# Patient Record
Sex: FEMALE
[De-identification: names, ages, dates, MRNs, and addresses within clinical notes are randomized per-mention and may not be internally consistent; named-entity substitution may affect disease eponyms.]

---

## 2020-07-20 PROBLEM — Z00.00 ENCOUNTER FOR PREVENTIVE HEALTH EXAMINATION: Status: ACTIVE | Noted: 2020-07-20

## 2020-07-21 ENCOUNTER — TRANSCRIPTION ENCOUNTER (OUTPATIENT)
Age: 65
End: 2020-07-21

## 2020-07-21 ENCOUNTER — APPOINTMENT (OUTPATIENT)
Dept: VASCULAR SURGERY | Facility: CLINIC | Age: 65
End: 2020-07-21
Payer: MEDICARE

## 2020-07-21 VITALS
SYSTOLIC BLOOD PRESSURE: 129 MMHG | HEART RATE: 95 BPM | BODY MASS INDEX: 30.56 KG/M2 | DIASTOLIC BLOOD PRESSURE: 81 MMHG | TEMPERATURE: 97.6 F | WEIGHT: 197 LBS | OXYGEN SATURATION: 96 % | HEIGHT: 67.5 IN

## 2020-07-21 DIAGNOSIS — S83.206A UNSPECIFIED TEAR OF UNSPECIFIED MENISCUS, CURRENT INJURY, RIGHT KNEE, INITIAL ENCOUNTER: ICD-10-CM

## 2020-07-21 DIAGNOSIS — Z82.49 FAMILY HISTORY OF ISCHEMIC HEART DISEASE AND OTHER DISEASES OF THE CIRCULATORY SYSTEM: ICD-10-CM

## 2020-07-21 DIAGNOSIS — Z72.89 OTHER PROBLEMS RELATED TO LIFESTYLE: ICD-10-CM

## 2020-07-21 DIAGNOSIS — I83.93 ASYMPTOMATIC VARICOSE VEINS OF BILATERAL LOWER EXTREMITIES: ICD-10-CM

## 2020-07-21 DIAGNOSIS — Z81.8 FAMILY HISTORY OF OTHER MENTAL AND BEHAVIORAL DISORDERS: ICD-10-CM

## 2020-07-21 DIAGNOSIS — Z87.891 PERSONAL HISTORY OF NICOTINE DEPENDENCE: ICD-10-CM

## 2020-07-21 DIAGNOSIS — M71.20 SYNOVIAL CYST OF POPLITEAL SPACE [BAKER], UNSPECIFIED KNEE: ICD-10-CM

## 2020-07-21 DIAGNOSIS — I83.10 VARICOSE VEINS OF UNSPECIFIED LOWER EXTREMITY WITH INFLAMMATION: ICD-10-CM

## 2020-07-21 PROCEDURE — 99204 OFFICE O/P NEW MOD 45 MIN: CPT

## 2020-07-21 PROCEDURE — 93970 EXTREMITY STUDY: CPT

## 2020-07-21 NOTE — ASSESSMENT
[Other: _____] : [unfilled] [Arterial/Venous Disease] : arterial/venous disease [FreeTextEntry1] : pt will need rt aasv evlt, rt stab phlebectomy and b/l sclerotherapy; Rx given for thigh high 20-30 mmHg support hose;

## 2020-07-21 NOTE — PHYSICAL EXAM
[JVD] : no jugular venous distention  [2+] : left 2+ [Ankle Swelling (On Exam)] : present [Varicose Veins Of Lower Extremities] : present [Varicose Veins Of The Right Leg] : of the right leg [Ankle Swelling Bilaterally] : severe [] : of the right leg [Ankle Swelling On The Left] : moderate [No Rash or Lesion] : No rash or lesion [Purpura] : no purpura  [Petechiae] : no petechiae [Skin Ulcer] : no ulcer [Alert] : alert [Skin Induration] : no induration [Oriented to Person] : oriented to person [Oriented to Place] : oriented to place [Oriented to Time] : oriented to time [Calm] : calm [de-identified] : wnl [de-identified] : wdwn nad [de-identified] : josel [de-identified] : wnl [FreeTextEntry1] : very large branch varicose veins rt anterolateral thigh, rt knee swelling, left medial ankle surgical incision \par b/l scattered spider veins ( see photos); no venous ulcers; normal arterial pulses b/l legs [de-identified] : as above

## 2020-07-21 NOTE — DATA REVIEWED
[FreeTextEntry1] : \par RVT Penalo performed b/l VLE:\par \par Today’s bilateral leg ultrasound results confirm no DVT and no SVT bilateral leg.\par \par + superficial reflux  severe rt aasv and left mid gsv\par + bilateral  Bakers cyst  rt> left\par \par

## 2020-07-21 NOTE — REASON FOR VISIT
[Initial Evaluation] : an initial evaluation [FreeTextEntry1] : Sent by her friend WARREN Kirby - pt with severe rt aasv reflux with very large anterolateral thigh branch varicose veins, swelling, pain, edmea and rt medial ankle venous stasis. Pt also has severe rt knee swelling with meniscus injury receiving physical therapy and steroid injections. Left mid gsv reflux but no severe left ;eg cvi sx at this time. Pt has intermittently worn support hose with minimal relief. She needs rt AASV evlt, stab phlebectomy and b/l sclerotherapy. Rx given for thigh high support hose 20-30 mmHg.

## 2020-07-21 NOTE — PROCEDURE
[FreeTextEntry1] : \par Endovenous Laser Ablation (EVLT)(41138)\par \par Ms. AARON ROACH with persistent and worsening severe right leg Stage 5venous insufficiency which is unresponsive to a trial of support stockings 20-30 mmHg, elevation, exercise and pain medication.  Patient has complaints of worsening right leg CVI symptoms of pain with swelling, fatigue, heaviness, throbbing, edema, cramping, restlessness, and painful varicosities.  The patient’s pain interferes with their ability to exercise and work.  \par Patient has worn 20-30 mmHg support hose daily with no definite  improvement in reflux symptoms.  This indicates that the patient will benefit from definitive therapy with endovenous laser ablation.  Patient has intact pulses in both legs with no evidence of arterial insufficiency.  \par Treatment is indicated not for cosmetic reasons but for symptomatic venous reflux disease with symptoms which ar /not responsive  to conservative therapy with high-grade medical support hose.  \par On venous ultrasound Duplex study, there is clear evidence of severe right leg AASV and left gsv vein reflux with branch varicosities.  \par The need for definitive effective treatment is based solely on severe, interfering and worsening reflux symptoms with evidence of severe right AASV reflux with branch varicosities on ultrasound.\par \par Discussed with the patient that in laser vein treatment, a thin laser fiber is inserted into the vein through a very small entry point in the leg, and the laser light that emits through this fiber will seal the faulty vein.  This part of the procedure takes just a few minutes and requires only a local anesthetic.   You may feel an unfamiliar sensation, it is not painful.  You are encouraged to walk immediately after the procedure, and you can resume normal activity the same day.  No heavy lifting or strenuous activities x 7 days.  After treatment, there may be some minor soreness and bruising and will resolve with time.  Any discomfort can be treated effectively with over-the-counter, non-aspirin pain relievers as necessary.  Patient must return in 1 week for follow up duplex ultrasound.\par \par

## 2020-08-04 ENCOUNTER — APPOINTMENT (OUTPATIENT)
Dept: VASCULAR SURGERY | Facility: CLINIC | Age: 65
End: 2020-08-04
Payer: MEDICARE

## 2020-08-04 VITALS
SYSTOLIC BLOOD PRESSURE: 152 MMHG | TEMPERATURE: 96.4 F | OXYGEN SATURATION: 96 % | HEART RATE: 73 BPM | DIASTOLIC BLOOD PRESSURE: 75 MMHG

## 2020-08-04 PROCEDURE — 36478 ENDOVENOUS LASER 1ST VEIN: CPT | Mod: RT

## 2020-08-04 NOTE — REASON FOR VISIT
[Procedure: _________] : a [unfilled] procedure visit [FreeTextEntry1] : pt with hx severe rt aasv reflux with large branch varicose veins and severe CVI sx. Performed rt aasv evlt without diffculty. Pt to followup next week for rt aasv vle - may need rt stab phlebectomy and sclerotherapy.

## 2020-08-04 NOTE — PROCEDURE
[FreeTextEntry1] : Patient has severe reflux right aasv and here for right aasv EVLT procedure.  AARON ROACH proceeded with right aasv EVLT with no complications.  AARON ROACH will follow up in one week for routine visit with VLE.\par \par \par  [FreeTextEntry3] : Right aasv endovenous laser ablation. \par \par Right Lower extremity varicose veins with inflammation, leg pain, leg swelling, and leg cramping.  Venous insufficiency, chronic venous hypertension and venous reflux.\par \par Ms. AARON ROACH is a 65 year year old F with a history of right lower extremity varicose veins previously seen in the office.  Ultrasound examination demonstrated reflux in the right aasv vein dumping into the patient’s varicosities in the right leg.  A trial of compression stockings, exercise, elevation, and pain medication was attempted without relief and as such, this patient was offered endovenous laser ablation therapy.  After explaining risks and benefits, informed consent was obtained and the patient agreed to proceed.  \par \par The patient was escorted to the procedure room and placed in the supine position on the examination table.  Laser safety glasses were placed on the patient.  The right leg was prepped and draped in the usual sterile fashion and time-out was called.  A sonographic probe was placed into a sterile sheath and the right lower extremity was imaged.  The right anterior accessory saphenous vein was identified under sonographic guidance and 1 mL of 1% lidocaine was administered subcutaneously using a 25G needle.  Using standard Seldinger technique, access to the right anterior accessory saphenous vein with the needle and corresponding guidewire was obtained.  The 4Fr Nir-Sheath introducer was advanced over the wire to the treatment location.  The position of the sheath tip is 2 cm below the Sapheno-Femoral Junction and verified using ultrasound guidance.  Dilator and guidewire removed.   NeverTouch laser fiber inserted into the sheath until the Fiber Stop Assembly came in contact with the end of the Sheath Hub.  Fiber stop assembly removed and pulled the sheath back and locked to the Sheath-Tom fitting.  Fiber location confirmed using ultrasound guidance.\par \par Local tumescent anesthesia under ultrasound guidance was administered to ensure delivery of just enough anesthesia, approximately 250 mL, to achieve thermal protection.\par Anesthesia:  Tumescent anesthesia.  Tumescent anesthesia:  250 mL 0.9% Sodium Chloride for injection poured into sterile blue basin and added 83 mL sterile injectable lidocaine 1% (without Epinephrine) using 20 mL syringe.\par \par Laser was set to continuous mode and adjusted to desired power.  Laser placed in Enable mode.  The laser was activated by depressing the foot pedal while withdrawing the fiber and sheath together at a rate of 1 cm per 5 seconds.  The operating of the laser was ceased by removing the foot from foot pedal when the fiber end was 2 - 3 cm from the access site as indicated by markers on the distal tip of the Nir-Sheath Introducer.   The sheath and laser fiber was removed and manual pressure applied at access site for 5 minutes.  Post procedure, the vein was imaged and showed successful closure of the right anterior accessory saphenous vein.  Dry dressing applied to access site, compression stocking 20 - 30 mm Hg applied to treated extremity.  The patient tolerated the procedure well with no complications.  Vital signs stable, patient was monitored throughout procedure.  The patient was escorted to the changing room.  \par \par Refer to procedure sheet for procedure start and end time, and burris, length, energy and time documentation. \par \par Post-Op Instructions:  The following instructions were reviewed with the patient and a print out of the instructions provided to patient at time of visit:   1)  Compression stocking to be worn 24 hours per day x 7 days.  2)  Do not get the stocking wet for the first 3 days.  3)  Ambulation immediately following procedure and as much as possible for the first 7 days.  4)  Contact the office if any questions or concerns. 5)  Patient must follow-up within the first week for post procedure reflux study performed in office.  Reviewed with the patient the follow up visit is to ensure that there are no complications such as a deep vein thrombosis (DVT).  Patient acknowledged understanding of post-op instructions and was provided with print out of instructions at time of visit.  \par \par Reviewed with the patient post-procedure endovenous laser ablation (EVLT) instructions, provided patient with printed copy of instructions along with Dr. Rosa’s cell phone number:  [phone], and advised patient to call for any questions or concerns prior to follow up visit.  All questions answered.\par \par

## 2020-08-11 ENCOUNTER — APPOINTMENT (OUTPATIENT)
Dept: VASCULAR SURGERY | Facility: CLINIC | Age: 65
End: 2020-08-11
Payer: MEDICARE

## 2020-08-11 VITALS — HEART RATE: 62 BPM | OXYGEN SATURATION: 97 % | DIASTOLIC BLOOD PRESSURE: 79 MMHG | SYSTOLIC BLOOD PRESSURE: 148 MMHG

## 2020-08-11 PROCEDURE — 99214 OFFICE O/P EST MOD 30 MIN: CPT | Mod: 25

## 2020-08-11 PROCEDURE — 93971 EXTREMITY STUDY: CPT

## 2020-08-11 NOTE — REASON FOR VISIT
[Follow-Up: _____] : a [unfilled] follow-up visit [FreeTextEntry1] : She is s/p right aasv EVLT.  Patient is here today for routine one week follow up visit with ultrasound.  Right leg ultrasound confirms no dvt, no HIIT, no truncal reflux and right aasv is closed as desired. Pt with no complaints - feels good and rt knee also feels better with support hose on. Pt scheduled to return for rt leg stab phlebectomy in September.\par \par

## 2020-08-11 NOTE — PHYSICAL EXAM
[JVD] : no jugular venous distention  [2+] : left 2+ [Ankle Swelling (On Exam)] : present [Varicose Veins Of The Right Leg] : of the right leg [Ankle Swelling Bilaterally] : severe [Varicose Veins Of Lower Extremities] : present [] : of the right leg [Ankle Swelling On The Left] : moderate [No Rash or Lesion] : No rash or lesion [Petechiae] : no petechiae [Purpura] : no purpura  [Skin Ulcer] : no ulcer [Skin Induration] : no induration [Alert] : alert [Oriented to Person] : oriented to person [Oriented to Time] : oriented to time [Oriented to Place] : oriented to place [Calm] : calm [de-identified] : wdwn nad [de-identified] : wnl [de-identified] : josel [FreeTextEntry1] : diminished size large branch varicose veins rt anterolateral thigh, normal bruising rt medial thigh s/p evlt, small area SVT right anterior shin branch vein\par  [de-identified] : as above [de-identified] : wnl

## 2020-08-11 NOTE — PROCEDURE
[FreeTextEntry1] : pt to use warm soaks, support hose for rt svt anterior shin - may continue to lai support hose as desired and return to exercise - pt to come for rt stab phlebectomy on 9/10/2020.

## 2020-08-11 NOTE — DATA REVIEWED
[FreeTextEntry1] : RVT Penalo performed rt aasv followup VLE - shows rt aasv closed as desired after evlt - no HIIT, no dvt, no truncal reflux - small area svt branch veins rt lower anterior shin

## 2020-09-08 ENCOUNTER — APPOINTMENT (OUTPATIENT)
Dept: VASCULAR SURGERY | Facility: CLINIC | Age: 65
End: 2020-09-08
Payer: MEDICARE

## 2020-09-08 VITALS
TEMPERATURE: 96.9 F | HEART RATE: 72 BPM | DIASTOLIC BLOOD PRESSURE: 83 MMHG | OXYGEN SATURATION: 92 % | SYSTOLIC BLOOD PRESSURE: 143 MMHG

## 2020-09-08 PROCEDURE — 99214 OFFICE O/P EST MOD 30 MIN: CPT | Mod: 25

## 2020-09-08 PROCEDURE — 93971 EXTREMITY STUDY: CPT

## 2020-09-22 ENCOUNTER — APPOINTMENT (OUTPATIENT)
Dept: VASCULAR SURGERY | Facility: CLINIC | Age: 65
End: 2020-09-22

## 2020-10-05 ENCOUNTER — APPOINTMENT (OUTPATIENT)
Dept: VASCULAR SURGERY | Facility: CLINIC | Age: 65
End: 2020-10-05
Payer: MEDICARE

## 2020-10-05 PROCEDURE — 36471 NJX SCLRSNT MLT INCMPTNT VN: CPT | Mod: RT

## 2020-10-05 RX ORDER — POLIDOCANOL 0.01 G/ML
1 INJECTION, SOLUTION INTRAVENOUS
Qty: 0 | Refills: 0 | Status: COMPLETED | OUTPATIENT
Start: 2020-10-05

## 2020-10-05 RX ADMIN — POLIDOCANOL 4 %: 0.01 INJECTION, SOLUTION INTRAVENOUS at 00:00

## 2020-10-05 NOTE — PROCEDURE
[FreeTextEntry3] : Right lower extremity sclerotherapy.\par \par Venous insufficiency.  Diffuse, painful right lower extremity small branch varicose veins with limb pain, swelling, itching, burning, tenderness and cramping.\par \par Ms. AARON ROACH is a 65 year year old F  with right lower extremity symptomatic small branch varicose veins.   The patient presented to the office for localized sclerotherapy injections.  After explaining risks and benefits, informed consent was obtained.  All questions answered.\par \par The patient was brought into the examination room and placed supine on procedure table.  Right leg for sclerotherapy treatment is cleaned with 70% isopropyl alcohol.  Sclerosant was mixed using 2 mL of 0.9% Sodium Chloride Injection and 2 mL of 1% polidocanol (Asclera) using filtered needle into sterile 5 mL syringe; 4 mL of 0.5% Asclera injected.  Filtered needle removed from syringe and sterile 30 G ½ inch needle is attached to syringe with sclerosant.  Using a 30 gauge needle, the sclerosant was directly injected into the symptomatic, superficial small branch varicose veins.   Injections were performed on right lower extremity and the veins were mostly localized to the right anterior thigh and shin.  At completion, treated area was wiped with dry gauze and thigh high 20-30 mmHg compression stockings placed, to be worn x 72 hours.  Patient tolerated the procedure well with no complications.\par Reviewed with the patient post procedure, sclerotherapy, instructions:  Patient instructed to wear compression stocking continuously for 3 days (72 hours) following the procedure and may take the stocking off for daily shower.  Walk for 15-20 minutes immediately following the procedure and daily for the next few days.  Avoid heavy exercise, sunbathing, hot baths/sauna, extended periods of sitting or standing, such as long plane flights for 2-3 days after treatment. Do not be alarmed if you see bruises, brown spots, black streaks, and lumps.  These are part of the healing process, and most will resolve with time.  It can take several weeks to see final results and additional treatments may be required.  Advised patient to call the office should they have any questions or concerns following sclerotherapy treatment. All questions answered.\par \par \par \par

## 2020-10-05 NOTE — REASON FOR VISIT
[Procedure: _________] : a [unfilled] procedure visit [FreeTextEntry1] : Patient with small painful and cramping, tender superficial branch varicose veins.  She is here for sclerotherapy session, right leg.  Ms. AARON ROACH will follow up in 2 weeks for next sclerotherapy session.\par

## 2020-10-19 ENCOUNTER — APPOINTMENT (OUTPATIENT)
Dept: VASCULAR SURGERY | Facility: CLINIC | Age: 65
End: 2020-10-19
Payer: MEDICARE

## 2020-10-19 VITALS
OXYGEN SATURATION: 98 % | HEART RATE: 67 BPM | SYSTOLIC BLOOD PRESSURE: 130 MMHG | DIASTOLIC BLOOD PRESSURE: 77 MMHG | TEMPERATURE: 96.8 F

## 2020-10-19 PROCEDURE — 99072 ADDL SUPL MATRL&STAF TM PHE: CPT

## 2020-10-19 PROCEDURE — 36471 NJX SCLRSNT MLT INCMPTNT VN: CPT | Mod: RT

## 2020-10-19 RX ORDER — POLIDOCANOL 0.01 G/ML
1 INJECTION, SOLUTION INTRAVENOUS
Qty: 0 | Refills: 0 | Status: COMPLETED | OUTPATIENT
Start: 2020-10-19

## 2020-10-19 RX ADMIN — POLIDOCANOL 4 %: 0.01 INJECTION, SOLUTION INTRAVENOUS at 00:00

## 2020-10-19 NOTE — PROCEDURE
[FreeTextEntry3] : Right lower extremity sclerotherapy.\par \par Venous insufficiency.  Diffuse, painful right lower extremity small branch varicose veins with limb pain, swelling, itching, burning, tenderness and cramping.\par \par Ms. AARON ROACH is a 65 year year old F  with right lower extremity symptomatic small branch varicose veins.   The patient presented to the office for localized sclerotherapy injections.  After explaining risks and benefits, informed consent was obtained.  All questions answered.\par \par The patient was brought into the examination room and placed supine on procedure table.  Right leg for sclerotherapy treatment is cleaned with 70% isopropyl alcohol.  Sclerosant was mixed using 2 mL of 0.9% Sodium Chloride Injection and 2 mL of 1% polidocanol (Asclera) using filtered needle into sterile 5 mL syringe; 4 mL of 0.5% Asclera injected.  Filtered needle removed from syringe and sterile 30 G ½ inch needle is attached to syringe with sclerosant.  Using a 30 gauge needle, the sclerosant was directly injected into the symptomatic, superficial small branch varicose veins.   Injections were performed on right lower extremity and the veins were mostly localized to the   [].  At completion, treated area was wiped with dry gauze and thigh high 20-30 mmHg compression stockings placed, to be worn x 72 hours.  Patient tolerated the procedure well with no complications.\par Reviewed with the patient post procedure, sclerotherapy, instructions:  Patient instructed to wear compression stocking continuously for 3 days (72 hours) following the procedure and may take the stocking off for daily shower.  Walk for 15-20 minutes immediately following the procedure and daily for the next few days.  Avoid heavy exercise, sunbathing, hot baths/sauna, extended periods of sitting or standing, such as long plane flights for 2-3 days after treatment. Do not be alarmed if you see bruises, brown spots, black streaks, and lumps.  These are part of the healing process, and most will resolve with time.  It can take several weeks to see final results and additional treatments may be required.  Advised patient to call the office should they have any questions or concerns following sclerotherapy treatment. All questions answered.\par \par \par \par

## 2020-11-09 ENCOUNTER — APPOINTMENT (OUTPATIENT)
Dept: VASCULAR SURGERY | Facility: CLINIC | Age: 65
End: 2020-11-09
Payer: MEDICARE

## 2020-11-09 VITALS
TEMPERATURE: 97.6 F | HEART RATE: 72 BPM | SYSTOLIC BLOOD PRESSURE: 133 MMHG | DIASTOLIC BLOOD PRESSURE: 77 MMHG | OXYGEN SATURATION: 98 %

## 2020-11-09 PROCEDURE — 99072 ADDL SUPL MATRL&STAF TM PHE: CPT

## 2020-11-09 PROCEDURE — 36471 NJX SCLRSNT MLT INCMPTNT VN: CPT | Mod: RT

## 2020-11-09 RX ORDER — POLIDOCANOL 0.01 G/ML
1 INJECTION, SOLUTION INTRAVENOUS
Qty: 0 | Refills: 0 | Status: COMPLETED | OUTPATIENT
Start: 2020-11-09

## 2020-11-09 RX ADMIN — POLIDOCANOL 4 %: 0.01 INJECTION, SOLUTION INTRAVENOUS at 00:00

## 2020-11-09 NOTE — PHYSICAL EXAM
[2+] : right 2+ [Ankle Swelling (On Exam)] : present [Varicose Veins Of Lower Extremities] : present [Ankle Swelling Bilaterally] : severe [Varicose Veins Of The Right Leg] : of the right leg [] : of the right leg [Ankle Swelling On The Left] : moderate [No Rash or Lesion] : No rash or lesion [Oriented to Person] : oriented to person [Alert] : alert [Oriented to Place] : oriented to place [Oriented to Time] : oriented to time [Calm] : calm [JVD] : no jugular venous distention  [Purpura] : no purpura  [Petechiae] : no petechiae [Skin Ulcer] : no ulcer [Skin Induration] : no induration [de-identified] : wdwn nad [de-identified] : wnl [de-identified] : josel [FreeTextEntry1] : significantly diminished size branch varicose veins rt anterolateral thigh,diffuse b/l leg severe small superficial veins\par  [de-identified] : wnl [de-identified] : as above

## 2020-11-09 NOTE — REASON FOR VISIT
[FreeTextEntry1] : Pt is s/p recent right AASV evlt and was scheduled today for rt leg stab phlebectomy for enalrged branch varicose veins right leg. However, on presentation it is clear that her rt leg branch veins are significantly diminished in size and she does not require stab phlebectomy at this time. Pt underwent rt leg VLE with RVT Penalo which confirmsa rt aasv closed c/w recent evlt and enlarged branch veins also closed now c/w SVT. Pt will return for b/l sclerotherapy.

## 2020-12-01 ENCOUNTER — APPOINTMENT (OUTPATIENT)
Dept: VASCULAR SURGERY | Facility: CLINIC | Age: 65
End: 2020-12-01
Payer: MEDICARE

## 2020-12-01 VITALS
SYSTOLIC BLOOD PRESSURE: 136 MMHG | TEMPERATURE: 96.3 F | DIASTOLIC BLOOD PRESSURE: 79 MMHG | OXYGEN SATURATION: 95 % | HEART RATE: 71 BPM

## 2020-12-01 PROCEDURE — 36471 NJX SCLRSNT MLT INCMPTNT VN: CPT | Mod: LT

## 2020-12-01 PROCEDURE — 99072 ADDL SUPL MATRL&STAF TM PHE: CPT

## 2020-12-01 RX ORDER — POLIDOCANOL 0.01 G/ML
1 INJECTION, SOLUTION INTRAVENOUS
Qty: 0 | Refills: 0 | Status: COMPLETED | OUTPATIENT
Start: 2020-12-01

## 2020-12-01 RX ADMIN — POLIDOCANOL 4 %: 0.01 INJECTION, SOLUTION INTRAVENOUS at 00:00

## 2020-12-01 NOTE — REASON FOR VISIT
[Procedure: _________] : a [unfilled] procedure visit [FreeTextEntry1] : Patient with small painful and cramping, tender superficial branch varicose veins.  She is here for sclerotherapy session, left leg.  Ms. AARON ROACH will follow up in 2 weeks for next sclerotherapy session.\par

## 2020-12-01 NOTE — PROCEDURE
[FreeTextEntry3] : Left lower extremity sclerotherapy.\par \par Venous insufficiency.  Diffuse, painful right lower extremity small branch varicose veins with limb pain, swelling, itching, burning, tenderness and cramping.\par \par Ms. AARON ROACH is a 65 year year old F  with left lower extremity symptomatic small branch varicose veins.   The patient presented to the office for localized sclerotherapy injections.  After explaining risks and benefits, informed consent was obtained.  All questions answered.\par \par The patient was brought into the examination room and placed supine on procedure table.  Left leg for sclerotherapy treatment is cleaned with 70% isopropyl alcohol.  Sclerosant was mixed using 2 mL of 0.9% Sodium Chloride Injection and 2 mL of 1% polidocanol (Asclera) using filtered needle into sterile 5 mL syringe; 4 mL of 0.5% Asclera injected.  Filtered needle removed from syringe and sterile 30 G ½ inch needle is attached to syringe with sclerosant.  Using a 30 gauge needle, the sclerosant was directly injected into the symptomatic, superficial small branch varicose veins.   Injections were performed on left lower extremity and the veins were mostly localized to the   [].  At completion, treated area was wiped with dry gauze and thigh high 20-30 mmHg compression stockings placed, to be worn x 72 hours.  Patient tolerated the procedure well with no complications.\par Reviewed with the patient post procedure, sclerotherapy, instructions:  Patient instructed to wear compression stocking continuously for 3 days (72 hours) following the procedure and may take the stocking off for daily shower.  Walk for 15-20 minutes immediately following the procedure and daily for the next few days.  Avoid heavy exercise, sunbathing, hot baths/sauna, extended periods of sitting or standing, such as long plane flights for 2-3 days after treatment. Do not be alarmed if you see bruises, brown spots, black streaks, and lumps.  These are part of the healing process, and most will resolve with time.  It can take several weeks to see final results and additional treatments may be required.  Registered nurse advised patient to call the office should they have any questions or concerns following sclerotherapy treatment.  All questions answered.  \par \par \par \par \par

## 2020-12-15 ENCOUNTER — APPOINTMENT (OUTPATIENT)
Dept: VASCULAR SURGERY | Facility: CLINIC | Age: 65
End: 2020-12-15
Payer: MEDICARE

## 2020-12-15 VITALS
SYSTOLIC BLOOD PRESSURE: 145 MMHG | HEART RATE: 75 BPM | TEMPERATURE: 95.6 F | OXYGEN SATURATION: 98 % | DIASTOLIC BLOOD PRESSURE: 74 MMHG

## 2020-12-15 DIAGNOSIS — M79.662 PAIN IN LEFT LOWER LEG: ICD-10-CM

## 2020-12-15 DIAGNOSIS — M79.89 PAIN IN LEFT LOWER LEG: ICD-10-CM

## 2020-12-15 PROCEDURE — 36471 NJX SCLRSNT MLT INCMPTNT VN: CPT | Mod: LT

## 2020-12-15 PROCEDURE — 99072 ADDL SUPL MATRL&STAF TM PHE: CPT

## 2020-12-15 RX ORDER — POLIDOCANOL 0.01 G/ML
1 INJECTION, SOLUTION INTRAVENOUS
Qty: 0 | Refills: 0 | Status: COMPLETED | OUTPATIENT
Start: 2020-12-15

## 2020-12-15 RX ADMIN — POLIDOCANOL 4 %: 0.01 INJECTION, SOLUTION INTRAVENOUS at 00:00

## 2020-12-15 NOTE — PROCEDURE
[FreeTextEntry3] : Left lower extremity sclerotherapy.\par \par Venous insufficiency.  Diffuse, painful right lower extremity small branch varicose veins with limb pain, swelling, itching, burning, tenderness and cramping.\par \par Ms. AARON ROACH is a 65 year year old F  with left lower extremity symptomatic small branch varicose veins.   The patient presented to the office for localized sclerotherapy injections.  After explaining risks and benefits, informed consent was obtained.  All questions answered.\par \par The patient was brought into the examination room and placed supine on procedure table.  Left leg for sclerotherapy treatment is cleaned with 70% isopropyl alcohol.  Sclerosant was mixed using 2 mL of 0.9% Sodium Chloride Injection and 2 mL of 1% polidocanol (Asclera) using filtered needle into sterile 5 mL syringe; 4 mL of 0.5% Asclera injected.  Filtered needle removed from syringe and sterile 30 G ½ inch needle is attached to syringe with sclerosant.  Using a 30 gauge needle, the sclerosant was directly injected into the symptomatic, superficial small branch varicose veins.   Injections were performed on left lower extremity and the veins were mostly localized to the   [].  At completion, treated area was wiped with dry gauze and thigh high 20-30 mmHg compression stockings placed, to be worn x 72 hours.  Patient tolerated the procedure well with no complications.\par Reviewed with the patient post procedure, sclerotherapy, instructions:  Patient instructed to wear compression stocking continuously for 3 days (72 hours) following the procedure and may take the stocking off for daily shower.  Walk for 15-20 minutes immediately following the procedure and daily for the next few days.  Avoid heavy exercise, sunbathing, hot baths/sauna, extended periods of sitting or standing, such as long plane flights for 2-3 days after treatment. Do not be alarmed if you see bruises, brown spots, black streaks, and lumps.  These are part of the healing process, and most will resolve with time.  It can take several weeks to see final results and additional treatments may be required.  Registered nurse advised patient to call the office should they have any questions or concerns following sclerotherapy treatment.  All questions answered.  \par \par \par \par

## 2021-01-07 ENCOUNTER — APPOINTMENT (OUTPATIENT)
Dept: VASCULAR SURGERY | Facility: CLINIC | Age: 66
End: 2021-01-07
Payer: MEDICARE

## 2021-01-07 VITALS
DIASTOLIC BLOOD PRESSURE: 81 MMHG | SYSTOLIC BLOOD PRESSURE: 121 MMHG | HEART RATE: 65 BPM | TEMPERATURE: 96.1 F | OXYGEN SATURATION: 95 %

## 2021-01-07 PROCEDURE — 36471 NJX SCLRSNT MLT INCMPTNT VN: CPT | Mod: LT

## 2021-01-07 PROCEDURE — 99072 ADDL SUPL MATRL&STAF TM PHE: CPT

## 2021-01-07 RX ORDER — POLIDOCANOL 0.01 G/ML
1 INJECTION, SOLUTION INTRAVENOUS
Qty: 0 | Refills: 0 | Status: COMPLETED | OUTPATIENT
Start: 2021-01-07

## 2021-01-07 RX ADMIN — POLIDOCANOL 4 %: 0.01 INJECTION, SOLUTION INTRAVENOUS at 00:00

## 2021-01-21 ENCOUNTER — APPOINTMENT (OUTPATIENT)
Dept: VASCULAR SURGERY | Facility: CLINIC | Age: 66
End: 2021-01-21

## 2021-02-04 ENCOUNTER — APPOINTMENT (OUTPATIENT)
Dept: VASCULAR SURGERY | Facility: CLINIC | Age: 66
End: 2021-02-04
Payer: MEDICARE

## 2021-02-04 VITALS
SYSTOLIC BLOOD PRESSURE: 133 MMHG | DIASTOLIC BLOOD PRESSURE: 78 MMHG | HEART RATE: 72 BPM | TEMPERATURE: 96.6 F | OXYGEN SATURATION: 97 %

## 2021-02-04 PROCEDURE — 36471 NJX SCLRSNT MLT INCMPTNT VN: CPT | Mod: LT

## 2021-02-04 PROCEDURE — 99072 ADDL SUPL MATRL&STAF TM PHE: CPT

## 2021-02-04 RX ORDER — POLIDOCANOL 0.01 G/ML
1 INJECTION, SOLUTION INTRAVENOUS
Qty: 0 | Refills: 0 | Status: COMPLETED | OUTPATIENT
Start: 2021-02-04

## 2021-02-04 RX ADMIN — POLIDOCANOL 4 %: 0.01 INJECTION, SOLUTION INTRAVENOUS at 00:00

## 2021-02-04 NOTE — PROCEDURE
[FreeTextEntry3] : Patient with small painful and cramping, tender superficial branch varicose veins.  She is here for sclerotherapy session, left leg.  Ms. AARON ROACH will follow up in 2 weeks for next sclerotherapy session.\par \par

## 2021-02-25 ENCOUNTER — APPOINTMENT (OUTPATIENT)
Dept: VASCULAR SURGERY | Facility: CLINIC | Age: 66
End: 2021-02-25
Payer: MEDICARE

## 2021-02-25 VITALS
DIASTOLIC BLOOD PRESSURE: 81 MMHG | HEART RATE: 74 BPM | TEMPERATURE: 95.1 F | SYSTOLIC BLOOD PRESSURE: 151 MMHG | OXYGEN SATURATION: 97 %

## 2021-02-25 PROCEDURE — 99072 ADDL SUPL MATRL&STAF TM PHE: CPT

## 2021-02-25 PROCEDURE — 36471 NJX SCLRSNT MLT INCMPTNT VN: CPT | Mod: LT

## 2021-02-25 RX ORDER — POLIDOCANOL 0.01 G/ML
1 INJECTION, SOLUTION INTRAVENOUS
Qty: 0 | Refills: 0 | Status: COMPLETED | OUTPATIENT
Start: 2021-02-25

## 2021-02-25 RX ADMIN — POLIDOCANOL 4 %: 0.01 INJECTION, SOLUTION INTRAVENOUS at 00:00

## 2021-02-25 NOTE — REASON FOR VISIT
[Procedure: _________] : a [unfilled] procedure visit [FreeTextEntry1] : Left lower extremity sclerotherapy.\par \par Venous insufficiency.  Diffuse, painful right lower extremity small branch varicose veins with limb pain, swelling, itching, burning, tenderness and cramping.\par \par Ms. AARON ROACH is a 65 year year old F  with left lower extremity symptomatic small branch varicose veins.   The patient presented to the office for localized sclerotherapy injections.  After explaining risks and benefits, informed consent was obtained.  All questions answered.\par \par The patient was brought into the examination room and placed supine on procedure table.  Left leg for sclerotherapy treatment is cleaned with 70% isopropyl alcohol.  Sclerosant was mixed using 2 mL of 0.9% Sodium Chloride Injection and 2 mL of 1% polidocanol (Asclera) using filtered needle into sterile 5 mL syringe; 4 mL of 0.5% Asclera injected.  Filtered needle removed from syringe and sterile 30 G ½ inch needle is attached to syringe with sclerosant.  Using a 30 gauge needle, the sclerosant was directly injected into the symptomatic, superficial small branch varicose veins.   Injections were performed on left lower extremity and the veins were mostly localized to the   [].  At completion, treated area was wiped with dry gauze and thigh high 20-30 mmHg compression stockings placed, to be worn x 72 hours.  Patient tolerated the procedure well with no complications.\par Reviewed with the patient post procedure, sclerotherapy, instructions:  Patient instructed to wear compression stocking continuously for 3 days (72 hours) following the procedure and may take the stocking off for daily shower.  Walk for 15-20 minutes immediately following the procedure and daily for the next few days.  Avoid heavy exercise, sunbathing, hot baths/sauna, extended periods of sitting or standing, such as long plane flights for 2-3 days after treatment. Do not be alarmed if you see bruises, brown spots, black streaks, and lumps.  These are part of the healing process, and most will resolve with time.  It can take several weeks to see final results and additional treatments may be required.  Registered nurse advised patient to call the office should they have any questions or concerns following sclerotherapy treatment.  All questions answered.  \par \par \par

## 2021-03-22 ENCOUNTER — APPOINTMENT (OUTPATIENT)
Dept: DERMATOLOGY | Facility: CLINIC | Age: 66
End: 2021-03-22
Payer: MEDICARE

## 2021-03-22 ENCOUNTER — LABORATORY RESULT (OUTPATIENT)
Age: 66
End: 2021-03-22

## 2021-03-22 DIAGNOSIS — D48.5 NEOPLASM OF UNCERTAIN BEHAVIOR OF SKIN: ICD-10-CM

## 2021-03-22 PROCEDURE — 99203 OFFICE O/P NEW LOW 30 MIN: CPT | Mod: 25

## 2021-03-22 PROCEDURE — 99072 ADDL SUPL MATRL&STAF TM PHE: CPT

## 2021-03-22 PROCEDURE — 11103 TANGNTL BX SKIN EA SEP/ADDL: CPT | Mod: 59

## 2021-03-22 PROCEDURE — 11102 TANGNTL BX SKIN SINGLE LES: CPT

## 2021-03-30 ENCOUNTER — APPOINTMENT (OUTPATIENT)
Dept: VASCULAR SURGERY | Facility: CLINIC | Age: 66
End: 2021-03-30
Payer: MEDICARE

## 2021-03-30 VITALS
TEMPERATURE: 97.6 F | SYSTOLIC BLOOD PRESSURE: 151 MMHG | OXYGEN SATURATION: 96 % | DIASTOLIC BLOOD PRESSURE: 82 MMHG | HEART RATE: 66 BPM

## 2021-03-30 PROCEDURE — 99072 ADDL SUPL MATRL&STAF TM PHE: CPT

## 2021-03-30 PROCEDURE — 36471 NJX SCLRSNT MLT INCMPTNT VN: CPT | Mod: RT

## 2021-03-30 RX ORDER — POLIDOCANOL 0.01 G/ML
1 INJECTION, SOLUTION INTRAVENOUS
Qty: 0 | Refills: 0 | Status: COMPLETED | OUTPATIENT
Start: 2021-03-30

## 2021-03-30 RX ADMIN — POLIDOCANOL 4 %: 0.01 INJECTION, SOLUTION INTRAVENOUS at 00:00

## 2021-03-30 NOTE — PROCEDURE
[FreeTextEntry3] : Right lower extremity sclerotherapy.\par \par Venous insufficiency.  Diffuse, painful right lower extremity small branch varicose veins with limb pain, swelling, itching, burning, tenderness and cramping.\par \par Ms. AARON ROACH is a 66 year year old F  with right lower extremity symptomatic small branch varicose veins.   The patient presented to the office for localized sclerotherapy injections.  After explaining risks and benefits, informed consent was obtained.  All questions answered.\par \par The patient was brought into the examination room and placed supine on procedure table.  Right leg for sclerotherapy treatment is cleaned with 70% isopropyl alcohol.  Sclerosant was mixed using 2 mL of 0.9% Sodium Chloride Injection and 2 mL of 1% polidocanol (Asclera) using filtered needle into sterile 5 mL syringe; 4 mL of 0.5% Asclera injected.  Filtered needle removed from syringe and sterile 30 G ½ inch needle is attached to syringe with sclerosant.  Using a 30 gauge needle, the sclerosant was directly injected into the symptomatic, superficial small branch varicose veins.   Injections were performed on right lower extremity and the veins were mostly localized to the   [].  At completion, treated area was wiped with dry gauze and thigh high 20-30 mmHg compression stockings placed, to be worn x 72 hours.  Patient tolerated the procedure well with no complications.\par Reviewed with the patient post procedure, sclerotherapy, instructions:  Patient instructed to wear compression stocking continuously for 3 days (72 hours) following the procedure and may take the stocking off for daily shower.  Walk for 15-20 minutes immediately following the procedure and daily for the next few days.  Avoid heavy exercise, sunbathing, hot baths/sauna, extended periods of sitting or standing, such as long plane flights for 2-3 days after treatment. Do not be alarmed if you see bruises, brown spots, black streaks, and lumps.  These are part of the healing process, and most will resolve with time.  It can take several weeks to see final results and additional treatments may be required.  Advised patient to call the office should they have any questions or concerns following sclerotherapy treatment. All questions answered.\par \par \par \par

## 2021-04-07 ENCOUNTER — NON-APPOINTMENT (OUTPATIENT)
Age: 66
End: 2021-04-07

## 2021-05-11 ENCOUNTER — APPOINTMENT (OUTPATIENT)
Dept: VASCULAR SURGERY | Facility: CLINIC | Age: 66
End: 2021-05-11

## 2021-11-18 ENCOUNTER — APPOINTMENT (OUTPATIENT)
Dept: VASCULAR SURGERY | Facility: CLINIC | Age: 66
End: 2021-11-18
Payer: MEDICARE

## 2021-11-18 VITALS
TEMPERATURE: 97.1 F | HEART RATE: 78 BPM | DIASTOLIC BLOOD PRESSURE: 79 MMHG | OXYGEN SATURATION: 96 % | SYSTOLIC BLOOD PRESSURE: 122 MMHG

## 2021-11-18 DIAGNOSIS — I83.893 VARICOSE VEINS OF BILATERAL LOWER EXTREMITIES WITH OTHER COMPLICATIONS: ICD-10-CM

## 2021-11-18 PROCEDURE — 93970 EXTREMITY STUDY: CPT

## 2021-11-18 PROCEDURE — 99214 OFFICE O/P EST MOD 30 MIN: CPT | Mod: 25

## 2021-11-18 NOTE — PHYSICAL EXAM
[JVD] : no jugular venous distention  [2+] : left 2+ [Ankle Swelling (On Exam)] : present [Ankle Swelling Bilaterally] : bilaterally  [Varicose Veins Of Lower Extremities] : present [Varicose Veins Of The Right Leg] : of the right leg [] : of the right leg [Ankle Swelling On The Left] : moderate [No Rash or Lesion] : No rash or lesion [Purpura] : no purpura  [Petechiae] : no petechiae [Skin Ulcer] : no ulcer [Skin Induration] : no induration [Alert] : alert [Oriented to Person] : oriented to person [Oriented to Place] : oriented to place [Oriented to Time] : oriented to time [Calm] : calm [de-identified] : wdwn nad [de-identified] : wnl [de-identified] : josel [FreeTextEntry1] : diffuse b/l leg severe small superficial veins, rt leg larger bulging varicose veins, venous dermatitis without open ulcer\par  [de-identified] : wnl [de-identified] : as above

## 2021-11-18 NOTE — PROCEDURE
[FreeTextEntry1] : Vascular surgeon discussed with the patient:\par Varicose veins are enlarged, twisted veins. Varicose veins are caused by increased blood pressure in the veins.  The blood moves towards the heart by 1-way valves in the veins. When the valves become weakened or damaged, blood can collect in the veins. This causes the veins to become enlarged. Sitting or standing for long periods can cause blood to pool in the leg veins, increasing the pressure within the veins. The veins can stretch from the increased pressure. This may weaken the walls of the veins and damage the valves.\par Varicose veins may be more common in some families (inherited).  Factors that may increase pressure include:  obesity, older age, being female, pregnancy, taking oral contraceptive pills or hormone replacement, being inactive, and/or smoking. \par The most common symptoms of varicose veins are sensations in the legs, such as a heavy feeling, burning, and/or aching. However, each individual may experience symptoms differently.  Other symptoms may include:  color changes in the skin, sores on the legs, or rash.  Severe varicose veins may eventually produce long-term mild swelling that can result in more serious skin and tissue problems, such as ulcers and non-healing sores.\par Varicose veins are diagnosed by a complete medical history, physical examination, and diagnostic studies for varicose veins including duplex ultrasound and color-flow imaging.  \par Medical treatment for varicose veins include:  compression stockings, sclerotherapy, endovenous laser ablation and/or surgical treatment microphlebectomy.  \par \par \par Pt to return for rt aasv RFA 12/02/21 and left gsv RFA 12/16/21.

## 2021-11-18 NOTE — HISTORY OF PRESENT ILLNESS
[FreeTextEntry1] : b/l vle performed today in office by RVT - shows recurrent Rt AASV reflux and left GSV reflux - no dvt no svt - pt will return for b/l RFA to treat the truncal reflux - she will also require branch vein treatment with combination of b/l sclerotherapy and stab phlebectomy

## 2021-12-02 ENCOUNTER — APPOINTMENT (OUTPATIENT)
Dept: VASCULAR SURGERY | Facility: CLINIC | Age: 66
End: 2021-12-02
Payer: MEDICARE

## 2021-12-02 VITALS
SYSTOLIC BLOOD PRESSURE: 146 MMHG | OXYGEN SATURATION: 97 % | DIASTOLIC BLOOD PRESSURE: 77 MMHG | HEART RATE: 70 BPM | TEMPERATURE: 97.9 F

## 2021-12-02 PROCEDURE — 36475 ENDOVENOUS RF 1ST VEIN: CPT | Mod: RT

## 2021-12-02 NOTE — REASON FOR VISIT
[Procedure: _________] : a [unfilled] procedure visit [FreeTextEntry1] : Patient has severe reflux right aasv and here for right aasv RFA  procedure.  AARON ROACH proceeded with right aasv RFA with no complications.  AARON ROACH will follow up in one week for routine visit with VLE.\par \par

## 2021-12-02 NOTE — PROCEDURE
[FreeTextEntry3] : Right aasv endovenous laser ablation. \par \par Right Lower extremity varicose veins with inflammation, leg pain, leg swelling, and leg cramping.  Venous insufficiency, chronic venous hypertension and venous reflux.\par \par Ms. AARON ROACH is a 66 year year old F with a history of right lower extremity varicose veins previously seen in the office.  Ultrasound examination demonstrated reflux in the right aasv vein dumping into the patient’s varicosities in the right leg.  A trial of compression stockings, exercise, elevation, and pain medication was attempted without relief and as such, this patient was offered RF ablation therapy.  After explaining risks and benefits, informed consent was obtained and the patient agreed to proceed.  \par \par The patient was escorted to the procedure room and placed in the supine position on the examination table. Safety glasses were placed on the patient.  The right leg was prepped and draped in the usual sterile fashion and time-out was called.  A sonographic probe was placed into a sterile sheath and the right lower extremity was imaged.  The right anterior accessory saphenous vein was identified under sonographic guidance and 1 mL of 1% lidocaine was administered subcutaneously using a 25G needle.  Using standard Seldinger technique, access to the right anterior accessory saphenous vein with the needle and corresponding guidewire was obtained.  The  introducer and dilator were advanced over the wire to the treatment location.  The dilator and wire were then removed and the RF fiber was introduced into the AASV thru the catheter. The position of the RF fiber 2 cm below the Sapheno-Femoral Junction was verified using ultrasound guidance.   \par \par Local tumescent anesthesia, under ultrasound guidance, was administered circumferentially around the length of the AASV to be treated to ensure delivery of adequate anesthesia and to achieve thermal protection.\par Anesthesia:  Tumescent anesthesia.  Tumescent anesthesia:  250 mL 0.9% Sodium Chloride for injection poured into sterile blue basin and added 50 mL sterile injectable lidocaine 1% (without Epinephrine) using 20 mL syringe.\par \par The RF was activated by pressing the activator button.  Heat was administered for the standard 20 seconds per segment with the most proximal segment at the AASV-SF junction treated twice. The entire remaining llength of the vein was then treated once per 7 cm segment for 20 seconds each time.  The operation of the RFA was ceased at 2 - 3 cm from the skin level access site as indicated by markers on the distal end of the fiber. The sheath and fiber were then removed and manual pressure applied at access site for hemostasis.  Post procedure, the vein was imaged and showed successful closure of the right anterior accessory saphenous vein.  Dry dressing applied to access site, compression stocking 20 - 30 mm Hg applied to treated extremity.  The patient tolerated the procedure well with no complications.  Vital signs stable, patient was monitored throughout procedure.  The patient was escorted to the changing room.  \par \par Refer to procedure sheet for procedure start and end time,  length treated and total treatment duration.\par \par Post-Op Instructions:  The following instructions were reviewed with the patient and a print out of the instructions provided to patient at time of visit:   1)  Compression stocking to be worn 24 hours per day x 7 days.  2)  Do not get the stocking wet for the first 3 days.  3)  Ambulation immediately following procedure and as much as possible for the first 7 days.  4)  Contact the office if any questions or concerns. 5)  Patient must follow-up within the first week for post procedure reflux study performed in office.  Reviewed with the patient the follow up visit is to ensure that there are no complications such as a deep vein thrombosis (DVT).  Patient acknowledged understanding of post-op instructions and was provided with print out of instructions at time of visit.  \par \par Reviewed with the patient post-procedure RFA  instructions, provided patient with printed copy of instructions along with Dr. Rosa’s cell phone number:  [phone], and advised patient to call for any questions or concerns prior to follow up visit.  All questions answered.\par \par

## 2021-12-09 ENCOUNTER — APPOINTMENT (OUTPATIENT)
Dept: VASCULAR SURGERY | Facility: CLINIC | Age: 66
End: 2021-12-09
Payer: MEDICARE

## 2021-12-09 VITALS
OXYGEN SATURATION: 99 % | DIASTOLIC BLOOD PRESSURE: 77 MMHG | TEMPERATURE: 98.6 F | HEART RATE: 66 BPM | SYSTOLIC BLOOD PRESSURE: 144 MMHG

## 2021-12-09 PROCEDURE — 99213 OFFICE O/P EST LOW 20 MIN: CPT | Mod: 25

## 2021-12-09 PROCEDURE — 93971 EXTREMITY STUDY: CPT | Mod: RT

## 2021-12-09 NOTE — REASON FOR VISIT
[Follow-Up: _____] : a [unfilled] follow-up visit [FreeTextEntry1] : She is s/p right aasv RFA  Patient is here today for routine one week follow up visit with ultrasound.  Right leg ultrasound confirms no dvt, no HIIT, no truncal reflux and right aasv is closed as desired. Pt to return next week for left gsv RFA.

## 2021-12-09 NOTE — PHYSICAL EXAM
[JVD] : no jugular venous distention  [2+] : left 2+ [Ankle Swelling (On Exam)] : present [Ankle Swelling Bilaterally] : bilaterally  [Varicose Veins Of Lower Extremities] : present [Varicose Veins Of The Right Leg] : of the right leg [] : of the right leg [Ankle Swelling On The Left] : moderate [No Rash or Lesion] : No rash or lesion [Purpura] : no purpura  [Petechiae] : no petechiae [Skin Ulcer] : no ulcer [Skin Induration] : no induration [Alert] : alert [Oriented to Person] : oriented to person [Oriented to Place] : oriented to place [Oriented to Time] : oriented to time [Calm] : calm [de-identified] : wdwn nad [de-identified] : wnl [de-identified] : josel [FreeTextEntry1] : diffuse b/l leg severe small superficial veins, rt leg larger bulging varicose veins, venous dermatitis without open ulcer; mild bruising rt proximal anterior thigh s/p recent rt aasv RFA\par  [de-identified] : wnl [de-identified] : as above

## 2021-12-09 NOTE — DATA REVIEWED
[FreeTextEntry1] : RVT performed rt AASV vle s/p recent rt aasv RFA -  aasv closed no dvt no reflux no EHIIT

## 2021-12-10 ENCOUNTER — NON-APPOINTMENT (OUTPATIENT)
Age: 66
End: 2021-12-10

## 2021-12-16 ENCOUNTER — APPOINTMENT (OUTPATIENT)
Dept: VASCULAR SURGERY | Facility: CLINIC | Age: 66
End: 2021-12-16
Payer: MEDICARE

## 2021-12-16 VITALS
TEMPERATURE: 97 F | OXYGEN SATURATION: 94 % | DIASTOLIC BLOOD PRESSURE: 80 MMHG | HEART RATE: 75 BPM | SYSTOLIC BLOOD PRESSURE: 136 MMHG

## 2021-12-16 PROCEDURE — 36475 ENDOVENOUS RF 1ST VEIN: CPT | Mod: LT

## 2021-12-16 NOTE — PROCEDURE
[FreeTextEntry3] : Ms. AARON ROACH is a 66 year year old F with a history of left lower extremity varicose veins previously seen in the office.  Ultrasound examination demonstrated reflux in the left gsv vein dumping into the patient’s varicosities in the left leg.  A trial of compression stockings, exercise, elevation, and pain medication was attempted without relief and as such, this patient was offered RFA ablation to treat the truncal reflux. Consent was obtained.  \par \par The patient was escorted to the procedure room and placed in the supine position on the examination table.   The left leg was prepped and draped in the usual sterile fashion and time-out was called.  A sonographic probe was placed into a sterile sheath and the left lower extremity was imaged.  The left greater saphenous vein was identified under sonographic guidance and 1 mL of 1% lidocaine was administered subcutaneously using a 25G needle.  Using standard Seldinger technique, access to the left greater saphenous vein with the needle and corresponding guidewire was obtained.  The RFA fiber was introduced into the GSV and the tip was placed 2.5 cm proximal to the SF junction. The position of the sheath tip  2.5 cm below the Sapheno-Femoral Junction was verified using ultrasound guidance.   \par \par Local tumescent anesthesia under ultrasound guidance was administered to ensure delivery of just enough anesthesia, approximately 250 mL, to achieve thermal protection.\par   Tumescent anesthesia:  250 mL 0.9% Sodium Chloride for injection poured into sterile blue basin and added 50 mL sterile injectable lidocaine 1% (without Epinephrine) using 20 mL syringe.\par The RFA was activated by depressing activator button - the proximal 7 cm segment was treated twice for 20 seconds each time. The catheter was then withdrawn to the next 7 cm segment and treated again for 20 seconds. This proceeded until the final segment.   The sheath and fiber were removed and manual pressure applied at access site for hemostasis.  Post procedure, the vein was imaged and showed successful closure of the left greater saphenous vein.  Dry dressing applied to access site, compression stocking 20 - 30 mm Hg applied to treated extremity.  The patient tolerated the procedure well with no complications.  Vital signs stable, patient was monitored throughout procedure.  The patient was escorted to the changing room.  \par \par Refer to procedure sheet for procedure start and end time, and burris, length, energy and time documentation. \par \par Post-Op Instructions:  The following instructions were reviewed with the patient and a print out of the instructions provided to patient at time of visit:   1)  Compression stocking to be worn 24 hours per day x 7 days.  2)  Do not get the stocking wet for the first 3 days.  3)  Ambulation immediately following procedure and as much as possible for the first 7 days.  4)  Contact the office if any questions or concerns. 5)  Patient must follow-up within the first week for post procedure reflux study performed in office.  Reviewed with the patient the follow up visit is to ensure that there are no complications such as a deep vein thrombosis (DVT).  Patient acknowledged understanding of post-op instructions and was provided with print out of instructions at time of visit.  \par \par Reviewed with the patient post-procedure RFA instructions, provided patient with printed copy of instructions along with Dr. Rosa’s cell phone number:  [phone], and advised patient to call for any questions or concerns prior to follow up visit.  All questions answered.\par \par

## 2021-12-16 NOTE — REASON FOR VISIT
[Procedure: _________] : a [unfilled] procedure visit [FreeTextEntry1] : Patient has severe reflux left gsv and here for left gsv RFA  procedure.  AARON ROACH proceeded with left gsv RFA with no complications.  AARON ROACH will follow up in one week for routine visit with VLE.\par \par

## 2021-12-19 ENCOUNTER — NON-APPOINTMENT (OUTPATIENT)
Age: 66
End: 2021-12-19

## 2021-12-20 ENCOUNTER — APPOINTMENT (OUTPATIENT)
Dept: OTOLARYNGOLOGY | Facility: CLINIC | Age: 66
End: 2021-12-20
Payer: MEDICARE

## 2021-12-20 DIAGNOSIS — H91.93 UNSPECIFIED HEARING LOSS, BILATERAL: ICD-10-CM

## 2021-12-20 PROCEDURE — 92567 TYMPANOMETRY: CPT

## 2021-12-20 PROCEDURE — G0268 REMOVAL OF IMPACTED WAX MD: CPT

## 2021-12-20 PROCEDURE — 99202 OFFICE O/P NEW SF 15 MIN: CPT | Mod: 25

## 2021-12-20 PROCEDURE — 92557 COMPREHENSIVE HEARING TEST: CPT

## 2021-12-20 NOTE — HISTORY OF PRESENT ILLNESS
[de-identified] : Initial visit.\par She presents today complaining of progressive gradual decrease hearing. This is especially noticeable with ambiant noise.\par She has low grade bilateral tinnitus.\par She does not a history of chronic ear problems.

## 2021-12-21 ENCOUNTER — APPOINTMENT (OUTPATIENT)
Dept: VASCULAR SURGERY | Facility: CLINIC | Age: 66
End: 2021-12-21
Payer: MEDICARE

## 2021-12-21 VITALS
DIASTOLIC BLOOD PRESSURE: 85 MMHG | OXYGEN SATURATION: 97 % | TEMPERATURE: 97.3 F | SYSTOLIC BLOOD PRESSURE: 136 MMHG | HEART RATE: 79 BPM

## 2021-12-21 DIAGNOSIS — I83.892 VARICOSE VEINS OF LEFT LOWER EXTREMITY WITH OTHER COMPLICATIONS: ICD-10-CM

## 2021-12-21 DIAGNOSIS — I83.812 VARICOSE VEINS OF LEFT LOWER EXTREMITY WITH PAIN: ICD-10-CM

## 2021-12-21 DIAGNOSIS — R25.2 CRAMP AND SPASM: ICD-10-CM

## 2021-12-21 DIAGNOSIS — I83.12 VARICOSE VEINS OF LEFT LOWER EXTREMITY WITH INFLAMMATION: ICD-10-CM

## 2021-12-21 PROCEDURE — 99213 OFFICE O/P EST LOW 20 MIN: CPT | Mod: 25

## 2021-12-21 PROCEDURE — 93971 EXTREMITY STUDY: CPT | Mod: LT

## 2021-12-21 NOTE — REASON FOR VISIT
[Follow-Up: _____] : a [unfilled] follow-up visit [FreeTextEntry1] : She is s/p left gsv RFA.  Patient is here today for routine follow up with VLE. Left leg ultrasound confirms no dvt, no HIIT, no truncal reflux and left gsv is closed as desired. Pt with excellent result - will follow up for b/l sclerotherapy as desires.\par \par \par

## 2021-12-21 NOTE — PHYSICAL EXAM
[JVD] : no jugular venous distention  [2+] : left 2+ [Ankle Swelling (On Exam)] : present [Ankle Swelling Bilaterally] : bilaterally  [Varicose Veins Of Lower Extremities] : present [Varicose Veins Of The Right Leg] : of the right leg [] : of the right leg [Ankle Swelling On The Left] : moderate [No Rash or Lesion] : No rash or lesion [Purpura] : no purpura  [Petechiae] : no petechiae [Skin Ulcer] : no ulcer [Skin Induration] : no induration [Alert] : alert [Oriented to Person] : oriented to person [Oriented to Place] : oriented to place [Oriented to Time] : oriented to time [Calm] : calm [de-identified] : wdwn nad [de-identified] : wnl [de-identified] : josel [FreeTextEntry1] : diffuse b/l leg small superficial veins\par  [de-identified] : wnl [de-identified] : as above

## 2021-12-21 NOTE — DATA REVIEWED
[FreeTextEntry1] : RVT performed left gsv vle s/p recent RFA - vein closed no dvt no reflux sf junction compressible and patent no EHIT.

## 2022-07-11 ENCOUNTER — TRANSCRIPTION ENCOUNTER (OUTPATIENT)
Age: 67
End: 2022-07-11

## 2022-08-10 ENCOUNTER — NON-APPOINTMENT (OUTPATIENT)
Age: 67
End: 2022-08-10

## 2022-09-06 ENCOUNTER — APPOINTMENT (OUTPATIENT)
Dept: OTOLARYNGOLOGY | Facility: CLINIC | Age: 67
End: 2022-09-06

## 2022-09-06 VITALS — WEIGHT: 197 LBS | BODY MASS INDEX: 30.56 KG/M2 | HEIGHT: 67.5 IN | TEMPERATURE: 95 F

## 2022-09-06 DIAGNOSIS — H90.3 SENSORINEURAL HEARING LOSS, BILATERAL: ICD-10-CM

## 2022-09-06 DIAGNOSIS — H92.09 OTALGIA, UNSPECIFIED EAR: ICD-10-CM

## 2022-09-06 PROCEDURE — 99213 OFFICE O/P EST LOW 20 MIN: CPT

## 2022-09-06 PROCEDURE — 92567 TYMPANOMETRY: CPT

## 2022-09-06 PROCEDURE — 92557 COMPREHENSIVE HEARING TEST: CPT

## 2022-09-06 RX ORDER — ROSUVASTATIN CALCIUM 5 MG/1
TABLET, FILM COATED ORAL
Refills: 0 | Status: ACTIVE | COMMUNITY

## 2022-09-06 RX ORDER — PREDNISONE 20 MG/1
20 TABLET ORAL
Qty: 10 | Refills: 0 | Status: COMPLETED | COMMUNITY
Start: 2022-04-29

## 2022-09-06 NOTE — HISTORY OF PRESENT ILLNESS
[de-identified] : She presents today with bilateral ear pain.\par Begins her history by reporting that in April 2022 she developed COVID.  She then had air travel.  She developed ear discomfort.  Her primary care physician appreciated that her ears were "red".\par At this point she continues to complain of bilateral ear pain.\par \par Of note she does wear an oral appliance for sleep apnea.

## 2022-09-06 NOTE — ASSESSMENT
[FreeTextEntry1] : Her hearing is stable and symmetric with a type C tympanogram on the left with normal speech discrimination scores.\par My differential diagnosis includes but is not limited to the component of eustachian tube dysfunction, TMJ D as well as potential secondary viral input from COVID.\par Supportive care such as antihistamine decongestant therapy is recommended.\par Follow-up pending her clinical course.

## 2022-09-06 NOTE — CONSULT LETTER
[Dear  ___] : Dear  [unfilled], [Courtesy Letter:] : I had the pleasure of seeing your patient, [unfilled], in my office today. [Please see my note below.] : Please see my note below. [Sincerely,] : Sincerely, [FreeTextEntry1] : \par Enclosed- audiogram. [FreeTextEntry3] : Garth Kim MD\par [facility]- Co-Director\par [facility] Physician [facility]

## 2022-10-06 ENCOUNTER — APPOINTMENT (OUTPATIENT)
Dept: DERMATOLOGY | Facility: CLINIC | Age: 67
End: 2022-10-06

## 2022-10-06 DIAGNOSIS — B35.3 TINEA PEDIS: ICD-10-CM

## 2022-10-06 DIAGNOSIS — D22.9 MELANOCYTIC NEVI, UNSPECIFIED: ICD-10-CM

## 2022-10-06 DIAGNOSIS — D23.9 OTHER BENIGN NEOPLASM OF SKIN, UNSPECIFIED: ICD-10-CM

## 2022-10-06 DIAGNOSIS — Z80.8 FAMILY HISTORY OF MALIGNANT NEOPLASM OF OTHER ORGANS OR SYSTEMS: ICD-10-CM

## 2022-10-06 PROCEDURE — 99214 OFFICE O/P EST MOD 30 MIN: CPT

## 2022-10-06 RX ORDER — KETOCONAZOLE 20 MG/G
2 CREAM TOPICAL
Qty: 1 | Refills: 11 | Status: ACTIVE | COMMUNITY
Start: 2022-10-06 | End: 1900-01-01

## 2022-10-06 NOTE — PHYSICAL EXAM
[Alert] : alert [Oriented x 3] : ~L oriented x 3 [Well Nourished] : well nourished [Conjunctiva Non-injected] : conjunctiva non-injected [No Visual Lymphadenopathy] : no visual  lymphadenopathy [No Clubbing] : no clubbing [No Edema] : no edema [No Bromhidrosis] : no bromhidrosis [No Chromhidrosis] : no chromhidrosis [Full Body Skin Exam Performed] : performed [FreeTextEntry3] : tan skin\par numerous even brown macules and papules\par firm pink brown papule that dimples with lateral pressure R leg\par stuck on brown plaques abdomen and right temple\par maceration webspaces\par \par

## 2022-10-06 NOTE — HISTORY OF PRESENT ILLNESS
[FreeTextEntry1] : fu moles [de-identified] : FH melanoma in father (acral, metastatic)\par RN, used to work in [employer]\par spot on temple, longstanding, and leg, physical therapist concerned\par

## 2023-10-10 ENCOUNTER — APPOINTMENT (OUTPATIENT)
Dept: VASCULAR SURGERY | Facility: CLINIC | Age: 68
End: 2023-10-10
Payer: MEDICARE

## 2023-10-10 VITALS
TEMPERATURE: 97 F | SYSTOLIC BLOOD PRESSURE: 145 MMHG | WEIGHT: 200 LBS | OXYGEN SATURATION: 98 % | BODY MASS INDEX: 31.39 KG/M2 | DIASTOLIC BLOOD PRESSURE: 79 MMHG | HEART RATE: 90 BPM | HEIGHT: 67 IN

## 2023-10-10 DIAGNOSIS — I83.813 VARICOSE VEINS OF BILATERAL LOWER EXTREMITIES WITH PAIN: ICD-10-CM

## 2023-10-10 DIAGNOSIS — I83.893 VARICOSE VEINS OF BILATERAL LOWER EXTREMITIES WITH OTHER COMPLICATIONS: ICD-10-CM

## 2023-10-10 DIAGNOSIS — M79.89 OTHER SPECIFIED SOFT TISSUE DISORDERS: ICD-10-CM

## 2023-10-10 PROCEDURE — 99213 OFFICE O/P EST LOW 20 MIN: CPT | Mod: 25

## 2023-10-10 PROCEDURE — 93970 EXTREMITY STUDY: CPT

## 2023-10-26 ENCOUNTER — APPOINTMENT (OUTPATIENT)
Dept: VASCULAR SURGERY | Facility: CLINIC | Age: 68
End: 2023-10-26
Payer: MEDICARE

## 2023-10-26 VITALS
HEART RATE: 74 BPM | OXYGEN SATURATION: 97 % | SYSTOLIC BLOOD PRESSURE: 136 MMHG | TEMPERATURE: 97.1 F | DIASTOLIC BLOOD PRESSURE: 84 MMHG

## 2023-10-26 DIAGNOSIS — G89.29 PAIN IN RIGHT LEG: ICD-10-CM

## 2023-10-26 DIAGNOSIS — M79.604 PAIN IN RIGHT LEG: ICD-10-CM

## 2023-10-26 DIAGNOSIS — L30.9 DERMATITIS, UNSPECIFIED: ICD-10-CM

## 2023-10-26 DIAGNOSIS — I87.2 VENOUS INSUFFICIENCY (CHRONIC) (PERIPHERAL): ICD-10-CM

## 2023-10-26 PROCEDURE — 36475 ENDOVENOUS RF 1ST VEIN: CPT | Mod: RT

## 2023-11-01 ENCOUNTER — APPOINTMENT (OUTPATIENT)
Dept: VASCULAR SURGERY | Facility: CLINIC | Age: 68
End: 2023-11-01
Payer: MEDICARE

## 2023-11-01 VITALS
TEMPERATURE: 97 F | SYSTOLIC BLOOD PRESSURE: 132 MMHG | HEART RATE: 76 BPM | OXYGEN SATURATION: 98 % | DIASTOLIC BLOOD PRESSURE: 80 MMHG

## 2023-11-01 DIAGNOSIS — M79.89 PAIN IN RIGHT LOWER LEG: ICD-10-CM

## 2023-11-01 DIAGNOSIS — M79.661 PAIN IN RIGHT LOWER LEG: ICD-10-CM

## 2023-11-01 PROCEDURE — 93971 EXTREMITY STUDY: CPT | Mod: RT

## 2023-11-01 PROCEDURE — 99213 OFFICE O/P EST LOW 20 MIN: CPT | Mod: 25

## 2023-11-13 ENCOUNTER — APPOINTMENT (OUTPATIENT)
Dept: VASCULAR SURGERY | Facility: CLINIC | Age: 68
End: 2023-11-13
Payer: MEDICARE

## 2023-11-13 VITALS
DIASTOLIC BLOOD PRESSURE: 80 MMHG | HEART RATE: 83 BPM | TEMPERATURE: 97.3 F | OXYGEN SATURATION: 99 % | SYSTOLIC BLOOD PRESSURE: 144 MMHG

## 2023-11-13 DIAGNOSIS — R25.2 CRAMP AND SPASM: ICD-10-CM

## 2023-11-13 PROCEDURE — 36471 NJX SCLRSNT MLT INCMPTNT VN: CPT | Mod: RT

## 2023-11-27 ENCOUNTER — APPOINTMENT (OUTPATIENT)
Dept: VASCULAR SURGERY | Facility: CLINIC | Age: 68
End: 2023-11-27
Payer: MEDICARE

## 2023-11-27 VITALS
DIASTOLIC BLOOD PRESSURE: 72 MMHG | TEMPERATURE: 97.4 F | OXYGEN SATURATION: 99 % | HEART RATE: 70 BPM | SYSTOLIC BLOOD PRESSURE: 117 MMHG

## 2023-11-27 PROCEDURE — 36471 NJX SCLRSNT MLT INCMPTNT VN: CPT | Mod: RT

## 2023-12-11 ENCOUNTER — APPOINTMENT (OUTPATIENT)
Dept: VASCULAR SURGERY | Facility: CLINIC | Age: 68
End: 2023-12-11
Payer: MEDICARE

## 2023-12-11 VITALS
SYSTOLIC BLOOD PRESSURE: 125 MMHG | DIASTOLIC BLOOD PRESSURE: 73 MMHG | TEMPERATURE: 97.6 F | HEART RATE: 73 BPM | OXYGEN SATURATION: 98 %

## 2023-12-11 PROCEDURE — 36471 NJX SCLRSNT MLT INCMPTNT VN: CPT | Mod: RT

## 2024-01-03 ENCOUNTER — APPOINTMENT (OUTPATIENT)
Dept: VASCULAR SURGERY | Facility: CLINIC | Age: 69
End: 2024-01-03
Payer: MEDICARE

## 2024-01-03 VITALS
HEART RATE: 71 BPM | DIASTOLIC BLOOD PRESSURE: 81 MMHG | OXYGEN SATURATION: 96 % | SYSTOLIC BLOOD PRESSURE: 165 MMHG | TEMPERATURE: 97.1 F

## 2024-01-03 DIAGNOSIS — I83.11 VARICOSE VEINS OF RIGHT LOWER EXTREMITY WITH INFLAMMATION: ICD-10-CM

## 2024-01-03 PROCEDURE — 36471 NJX SCLRSNT MLT INCMPTNT VN: CPT | Mod: RT

## 2024-01-03 NOTE — REASON FOR VISIT
[Procedure: _________] : a [unfilled] procedure visit [FreeTextEntry1] : Patient with small painful and cramping, tender superficial branch varicose veins.  She is here for sclerotherapy session, right leg.  Ms. AARON ROACH will follow up in 2 weeks for next sclerotherapy session.

## 2024-01-03 NOTE — PROCEDURE
[FreeTextEntry1] : right leg sclerotherapy [FreeTextEntry3] : Right lower extremity sclerotherapy.  Venous insufficiency.  Diffuse, painful right lower extremity small branch varicose veins with limb pain, swelling, itching, burning, tenderness and cramping.  Ms. AARON ROACH is a 68 year year old F  with right lower extremity symptomatic small branch varicose veins.   The patient presented to the office for localized sclerotherapy injections.  After explaining risks and benefits, informed consent was obtained.  All questions answered.  The patient was brought into the examination room and placed supine on procedure table.  Right leg for sclerotherapy treatment is cleaned with 70% isopropyl alcohol.  Sclerosant was mixed using 2 mL of 0.9% Sodium Chloride Injection and 2 mL of 1% polidocanol (Asclera) using filtered needle into sterile 5 mL syringe; 4 mL of 0.5% Asclera injected.  Filtered needle removed from syringe and sterile 30 G  inch needle is attached to syringe with sclerosant.  Using a 30 gauge needle, the sclerosant was directly injected into the symptomatic, superficial small branch varicose veins.   Injections were performed on right lower extremity and the veins were mostly localized to the   ~  ~.  At completion, treated area was wiped with dry gauze and thigh high 20-30 mmHg compression stockings placed, to be worn x 72 hours.  Patient tolerated the procedure well with no complications. Reviewed with the patient post procedure, sclerotherapy, instructions:  Patient instructed to wear compression stocking continuously for 3 days (72 hours) following the procedure and may take the stocking off for daily shower.  Walk for 15-20 minutes immediately following the procedure and daily for the next few days.  Avoid heavy exercise, sunbathing, hot baths/sauna, extended periods of sitting or standing, such as long plane flights for 2-3 days after treatment. Do not be alarmed if you see bruises, brown spots, black streaks, and lumps.  These are part of the healing process, and most will resolve with time.  It can take several weeks to see final results and additional treatments may be required.  Advised patient to call the office should they have any questions or concerns following sclerotherapy treatment. All questions answered.

## 2024-01-24 ENCOUNTER — APPOINTMENT (OUTPATIENT)
Dept: VASCULAR SURGERY | Facility: CLINIC | Age: 69
End: 2024-01-24
Payer: MEDICARE

## 2024-01-24 VITALS
OXYGEN SATURATION: 99 % | HEART RATE: 67 BPM | SYSTOLIC BLOOD PRESSURE: 144 MMHG | TEMPERATURE: 97.1 F | DIASTOLIC BLOOD PRESSURE: 87 MMHG

## 2024-01-24 DIAGNOSIS — I83.891 VARICOSE VEINS OF RIGHT LOWER EXTREMITY WITH OTHER COMPLICATIONS: ICD-10-CM

## 2024-01-24 DIAGNOSIS — I83.811 VARICOSE VEINS OF RIGHT LOWER EXTREMITY WITH PAIN: ICD-10-CM

## 2024-01-24 PROCEDURE — 36471 NJX SCLRSNT MLT INCMPTNT VN: CPT | Mod: RT

## 2024-02-21 ENCOUNTER — APPOINTMENT (OUTPATIENT)
Dept: VASCULAR SURGERY | Facility: CLINIC | Age: 69
End: 2024-02-21

## 2024-02-29 ENCOUNTER — APPOINTMENT (OUTPATIENT)
Dept: DERMATOLOGY | Facility: CLINIC | Age: 69
End: 2024-02-29
Payer: MEDICARE

## 2024-02-29 DIAGNOSIS — Z12.83 ENCOUNTER FOR SCREENING FOR MALIGNANT NEOPLASM OF SKIN: ICD-10-CM

## 2024-02-29 DIAGNOSIS — L82.0 INFLAMED SEBORRHEIC KERATOSIS: ICD-10-CM

## 2024-02-29 DIAGNOSIS — L53.9 ERYTHEMATOUS CONDITION, UNSPECIFIED: ICD-10-CM

## 2024-02-29 PROCEDURE — 99213 OFFICE O/P EST LOW 20 MIN: CPT

## 2024-02-29 NOTE — HISTORY OF PRESENT ILLNESS
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

## 2024-02-29 NOTE — PHYSICAL EXAM
[Alert] : alert [Oriented x 3] : ~L oriented x 3 [Well Nourished] : well nourished [Conjunctiva Non-injected] : conjunctiva non-injected [No Visual Lymphadenopathy] : no visual  lymphadenopathy [No Clubbing] : no clubbing [No Edema] : no edema [No Bromhidrosis] : no bromhidrosis [No Chromhidrosis] : no chromhidrosis [Full Body Skin Exam Performed] : performed [FreeTextEntry3] : tan skin numerous even brown macules and papules firm pink brown papule that dimples with lateral pressure R leg stuck on brown papules abdomen and right temple patchy blanchable erythema bilateral breasts greater on L

## 2024-08-28 NOTE — VITALS
Dry, sterile, transparent dressing applied.  [Dull] : dull [Aching] : aching [FreeTextEntry3] : right leg

## 2024-12-25 PROBLEM — F10.90 ALCOHOL USE: Status: ACTIVE | Noted: 2020-07-21

## 2025-09-13 ENCOUNTER — NON-APPOINTMENT (OUTPATIENT)
Age: 70
End: 2025-09-13

## 2025-09-15 ENCOUNTER — APPOINTMENT (OUTPATIENT)
Dept: VASCULAR SURGERY | Facility: CLINIC | Age: 70
End: 2025-09-15
Payer: MEDICARE

## 2025-09-15 VITALS
HEIGHT: 67 IN | OXYGEN SATURATION: 99 % | HEART RATE: 76 BPM | BODY MASS INDEX: 31.71 KG/M2 | DIASTOLIC BLOOD PRESSURE: 86 MMHG | SYSTOLIC BLOOD PRESSURE: 142 MMHG | WEIGHT: 202 LBS | TEMPERATURE: 95 F

## 2025-09-15 DIAGNOSIS — L30.9 DERMATITIS, UNSPECIFIED: ICD-10-CM

## 2025-09-15 DIAGNOSIS — I87.2 VENOUS INSUFFICIENCY (CHRONIC) (PERIPHERAL): ICD-10-CM

## 2025-09-15 DIAGNOSIS — I83.893 VARICOSE VEINS OF BILATERAL LOWER EXTREMITIES WITH OTHER COMPLICATIONS: ICD-10-CM

## 2025-09-15 DIAGNOSIS — I83.813 VARICOSE VEINS OF BILATERAL LOWER EXTREMITIES WITH PAIN: ICD-10-CM

## 2025-09-15 DIAGNOSIS — M79.89 OTHER SPECIFIED SOFT TISSUE DISORDERS: ICD-10-CM

## 2025-09-15 PROCEDURE — 93970 EXTREMITY STUDY: CPT

## 2025-09-15 PROCEDURE — 99213 OFFICE O/P EST LOW 20 MIN: CPT | Mod: 25
